# Patient Record
Sex: FEMALE | Race: WHITE | URBAN - METROPOLITAN AREA
[De-identification: names, ages, dates, MRNs, and addresses within clinical notes are randomized per-mention and may not be internally consistent; named-entity substitution may affect disease eponyms.]

---
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
## 2024-10-04 ENCOUNTER — APPOINTMENT (OUTPATIENT)
Dept: RADIOLOGY | Facility: MEDICAL CENTER | Age: 24
End: 2024-10-04
Attending: EMERGENCY MEDICINE
Payer: MEDICAID

## 2024-10-04 ENCOUNTER — HOSPITAL ENCOUNTER (EMERGENCY)
Facility: MEDICAL CENTER | Age: 24
End: 2024-10-04
Attending: EMERGENCY MEDICINE
Payer: MEDICAID

## 2024-10-04 VITALS
OXYGEN SATURATION: 97 % | SYSTOLIC BLOOD PRESSURE: 98 MMHG | RESPIRATION RATE: 20 BRPM | DIASTOLIC BLOOD PRESSURE: 70 MMHG | TEMPERATURE: 98.2 F | HEART RATE: 90 BPM

## 2024-10-04 DIAGNOSIS — N91.2 AMENORRHEA: ICD-10-CM

## 2024-10-04 DIAGNOSIS — M26.609 TMJ (TEMPOROMANDIBULAR JOINT SYNDROME): ICD-10-CM

## 2024-10-04 LAB
AMORPH CRY #/AREA URNS HPF: PRESENT /HPF
APPEARANCE UR: CLEAR
BACTERIA #/AREA URNS HPF: ABNORMAL /HPF
BILIRUB UR QL STRIP.AUTO: NEGATIVE
C TRACH DNA SPEC QL NAA+PROBE: NEGATIVE
COLOR UR: YELLOW
EPI CELLS #/AREA URNS HPF: ABNORMAL /HPF
GLUCOSE UR STRIP.AUTO-MCNC: NEGATIVE MG/DL
HCG UR QL: NEGATIVE
HIV 1+2 AB+HIV1 P24 AG SERPL QL IA: NORMAL
HYALINE CASTS #/AREA URNS LPF: ABNORMAL /LPF
KETONES UR STRIP.AUTO-MCNC: NEGATIVE MG/DL
LEUKOCYTE ESTERASE UR QL STRIP.AUTO: NEGATIVE
MICRO URNS: ABNORMAL
N GONORRHOEA DNA SPEC QL NAA+PROBE: NEGATIVE
NITRITE UR QL STRIP.AUTO: NEGATIVE
PH UR STRIP.AUTO: 7.5 [PH] (ref 5–8)
PROT UR QL STRIP: NEGATIVE MG/DL
RBC # URNS HPF: ABNORMAL /HPF
RBC UR QL AUTO: ABNORMAL
SP GR UR STRIP.AUTO: 1.02
SPECIMEN SOURCE: NORMAL
T PALLIDUM AB SER QL IA: NONREACTIVE
UROBILINOGEN UR STRIP.AUTO-MCNC: 0.2 MG/DL
WBC #/AREA URNS HPF: ABNORMAL /HPF

## 2024-10-04 PROCEDURE — 99284 EMERGENCY DEPT VISIT MOD MDM: CPT | Mod: EDC

## 2024-10-04 PROCEDURE — 86780 TREPONEMA PALLIDUM: CPT

## 2024-10-04 PROCEDURE — 87389 HIV-1 AG W/HIV-1&-2 AB AG IA: CPT

## 2024-10-04 PROCEDURE — 70355 PANORAMIC X-RAY OF JAWS: CPT

## 2024-10-04 PROCEDURE — A9270 NON-COVERED ITEM OR SERVICE: HCPCS | Mod: UD | Performed by: EMERGENCY MEDICINE

## 2024-10-04 PROCEDURE — 87491 CHLMYD TRACH DNA AMP PROBE: CPT

## 2024-10-04 PROCEDURE — 81025 URINE PREGNANCY TEST: CPT

## 2024-10-04 PROCEDURE — 87591 N.GONORRHOEAE DNA AMP PROB: CPT

## 2024-10-04 PROCEDURE — 81001 URINALYSIS AUTO W/SCOPE: CPT

## 2024-10-04 PROCEDURE — 700102 HCHG RX REV CODE 250 W/ 637 OVERRIDE(OP): Mod: UD | Performed by: EMERGENCY MEDICINE

## 2024-10-04 PROCEDURE — 36415 COLL VENOUS BLD VENIPUNCTURE: CPT | Mod: EDC

## 2024-10-04 RX ORDER — IBUPROFEN 600 MG/1
600 TABLET, FILM COATED ORAL ONCE
Status: COMPLETED | OUTPATIENT
Start: 2024-10-04 | End: 2024-10-04

## 2024-10-04 RX ORDER — ACETAMINOPHEN 500 MG
1000 TABLET ORAL ONCE
Status: COMPLETED | OUTPATIENT
Start: 2024-10-04 | End: 2024-10-04

## 2024-10-04 RX ADMIN — ACETAMINOPHEN 1000 MG: 500 TABLET ORAL at 12:04

## 2024-10-04 RX ADMIN — IBUPROFEN 600 MG: 600 TABLET, FILM COATED ORAL at 12:04

## 2024-10-04 ASSESSMENT — PAIN DESCRIPTION - PAIN TYPE: TYPE: ACUTE PAIN

## 2025-06-05 ENCOUNTER — HOSPITAL ENCOUNTER (EMERGENCY)
Facility: MEDICAL CENTER | Age: 25
End: 2025-06-05
Attending: EMERGENCY MEDICINE
Payer: MEDICAID

## 2025-06-05 VITALS
SYSTOLIC BLOOD PRESSURE: 104 MMHG | BODY MASS INDEX: 23 KG/M2 | HEART RATE: 60 BPM | HEIGHT: 62 IN | RESPIRATION RATE: 14 BRPM | OXYGEN SATURATION: 99 % | TEMPERATURE: 97.9 F | WEIGHT: 125 LBS | DIASTOLIC BLOOD PRESSURE: 67 MMHG

## 2025-06-05 DIAGNOSIS — F25.0 SCHIZOAFFECTIVE DISORDER, BIPOLAR TYPE (HCC): ICD-10-CM

## 2025-06-05 DIAGNOSIS — F30.9 MANIA (HCC): Primary | ICD-10-CM

## 2025-06-05 LAB
ALBUMIN SERPL BCP-MCNC: 4.5 G/DL (ref 3.2–4.9)
ALBUMIN/GLOB SERPL: 1.6 G/DL
ALP SERPL-CCNC: 107 U/L (ref 30–99)
ALT SERPL-CCNC: 37 U/L (ref 2–50)
AMPHET UR QL SCN: NEGATIVE
ANION GAP SERPL CALC-SCNC: 12 MMOL/L (ref 7–16)
AST SERPL-CCNC: 37 U/L (ref 12–45)
BARBITURATES UR QL SCN: NEGATIVE
BASOPHILS # BLD AUTO: 0.5 % (ref 0–1.8)
BASOPHILS # BLD: 0.05 K/UL (ref 0–0.12)
BENZODIAZ UR QL SCN: NEGATIVE
BILIRUB SERPL-MCNC: 0.2 MG/DL (ref 0.1–1.5)
BUN SERPL-MCNC: 13 MG/DL (ref 8–22)
BZE UR QL SCN: NEGATIVE
CALCIUM ALBUM COR SERPL-MCNC: 8.6 MG/DL (ref 8.5–10.5)
CALCIUM SERPL-MCNC: 9 MG/DL (ref 8.5–10.5)
CANNABINOIDS UR QL SCN: POSITIVE
CHLORIDE SERPL-SCNC: 106 MMOL/L (ref 96–112)
CO2 SERPL-SCNC: 23 MMOL/L (ref 20–33)
CREAT SERPL-MCNC: 0.73 MG/DL (ref 0.5–1.4)
EOSINOPHIL # BLD AUTO: 0.03 K/UL (ref 0–0.51)
EOSINOPHIL NFR BLD: 0.3 % (ref 0–6.9)
ERYTHROCYTE [DISTWIDTH] IN BLOOD BY AUTOMATED COUNT: 44.3 FL (ref 35.9–50)
FENTANYL UR QL: NEGATIVE
GFR SERPLBLD CREATININE-BSD FMLA CKD-EPI: 118 ML/MIN/1.73 M 2
GLOBULIN SER CALC-MCNC: 2.9 G/DL (ref 1.9–3.5)
GLUCOSE SERPL-MCNC: 88 MG/DL (ref 65–99)
HCG SERPL QL: NEGATIVE
HCT VFR BLD AUTO: 38.6 % (ref 37–47)
HGB BLD-MCNC: 12.7 G/DL (ref 12–16)
HIV 1+2 AB+HIV1 P24 AG SERPL QL IA: NORMAL
IMM GRANULOCYTES # BLD AUTO: 0.05 K/UL (ref 0–0.11)
IMM GRANULOCYTES NFR BLD AUTO: 0.5 % (ref 0–0.9)
LYMPHOCYTES # BLD AUTO: 2.72 K/UL (ref 1–4.8)
LYMPHOCYTES NFR BLD: 29.4 % (ref 22–41)
MCH RBC QN AUTO: 30.8 PG (ref 27–33)
MCHC RBC AUTO-ENTMCNC: 32.9 G/DL (ref 32.2–35.5)
MCV RBC AUTO: 93.5 FL (ref 81.4–97.8)
METHADONE UR QL SCN: NEGATIVE
MONOCYTES # BLD AUTO: 0.5 K/UL (ref 0–0.85)
MONOCYTES NFR BLD AUTO: 5.4 % (ref 0–13.4)
NEUTROPHILS # BLD AUTO: 5.91 K/UL (ref 1.82–7.42)
NEUTROPHILS NFR BLD: 63.9 % (ref 44–72)
NRBC # BLD AUTO: 0 K/UL
NRBC BLD-RTO: 0 /100 WBC (ref 0–0.2)
OPIATES UR QL SCN: NEGATIVE
OXYCODONE UR QL SCN: NEGATIVE
PCP UR QL SCN: NEGATIVE
PLATELET # BLD AUTO: 287 K/UL (ref 164–446)
PMV BLD AUTO: 10.1 FL (ref 9–12.9)
POTASSIUM SERPL-SCNC: 3.8 MMOL/L (ref 3.6–5.5)
PROPOXYPH UR QL SCN: NEGATIVE
PROT SERPL-MCNC: 7.4 G/DL (ref 6–8.2)
RBC # BLD AUTO: 4.13 M/UL (ref 4.2–5.4)
SODIUM SERPL-SCNC: 141 MMOL/L (ref 135–145)
T PALLIDUM AB SER QL IA: NORMAL
TSH SERPL DL<=0.005 MIU/L-ACNC: 2.14 UIU/ML (ref 0.38–5.33)
WBC # BLD AUTO: 9.3 K/UL (ref 4.8–10.8)

## 2025-06-05 PROCEDURE — 85025 COMPLETE CBC W/AUTO DIFF WBC: CPT

## 2025-06-05 PROCEDURE — 86780 TREPONEMA PALLIDUM: CPT

## 2025-06-05 PROCEDURE — 36415 COLL VENOUS BLD VENIPUNCTURE: CPT

## 2025-06-05 PROCEDURE — A9270 NON-COVERED ITEM OR SERVICE: HCPCS | Mod: UD | Performed by: EMERGENCY MEDICINE

## 2025-06-05 PROCEDURE — 99285 EMERGENCY DEPT VISIT HI MDM: CPT

## 2025-06-05 PROCEDURE — 84703 CHORIONIC GONADOTROPIN ASSAY: CPT

## 2025-06-05 PROCEDURE — 700111 HCHG RX REV CODE 636 W/ 250 OVERRIDE (IP): Mod: JZ,UD | Performed by: EMERGENCY MEDICINE

## 2025-06-05 PROCEDURE — 80053 COMPREHEN METABOLIC PANEL: CPT

## 2025-06-05 PROCEDURE — 87591 N.GONORRHOEAE DNA AMP PROB: CPT

## 2025-06-05 PROCEDURE — 80307 DRUG TEST PRSMV CHEM ANLYZR: CPT

## 2025-06-05 PROCEDURE — 96372 THER/PROPH/DIAG INJ SC/IM: CPT

## 2025-06-05 PROCEDURE — 700102 HCHG RX REV CODE 250 W/ 637 OVERRIDE(OP): Mod: UD | Performed by: EMERGENCY MEDICINE

## 2025-06-05 PROCEDURE — 87491 CHLMYD TRACH DNA AMP PROBE: CPT

## 2025-06-05 PROCEDURE — 90791 PSYCH DIAGNOSTIC EVALUATION: CPT

## 2025-06-05 PROCEDURE — 84443 ASSAY THYROID STIM HORMONE: CPT

## 2025-06-05 PROCEDURE — 87389 HIV-1 AG W/HIV-1&-2 AB AG IA: CPT

## 2025-06-05 RX ORDER — HALOPERIDOL 5 MG/ML
5 INJECTION INTRAMUSCULAR ONCE
Status: COMPLETED | OUTPATIENT
Start: 2025-06-05 | End: 2025-06-05

## 2025-06-05 RX ORDER — HYDROXYZINE HYDROCHLORIDE 25 MG/1
25 TABLET, FILM COATED ORAL ONCE
Status: COMPLETED | OUTPATIENT
Start: 2025-06-05 | End: 2025-06-05

## 2025-06-05 RX ORDER — HALOPERIDOL 5 MG/1
5 TABLET ORAL ONCE
Status: COMPLETED | OUTPATIENT
Start: 2025-06-05 | End: 2025-06-05

## 2025-06-05 RX ORDER — DIAZEPAM 5 MG/1
5 TABLET ORAL ONCE
Status: COMPLETED | OUTPATIENT
Start: 2025-06-05 | End: 2025-06-05

## 2025-06-05 RX ORDER — MIDAZOLAM HYDROCHLORIDE 5 MG/ML
4 INJECTION INTRAMUSCULAR; INTRAVENOUS ONCE
Status: COMPLETED | OUTPATIENT
Start: 2025-06-05 | End: 2025-06-05

## 2025-06-05 RX ADMIN — MIDAZOLAM HYDROCHLORIDE 4 MG: 5 INJECTION, SOLUTION INTRAMUSCULAR; INTRAVENOUS at 12:45

## 2025-06-05 RX ADMIN — HALOPERIDOL LACTATE 5 MG: 5 INJECTION, SOLUTION INTRAMUSCULAR at 12:46

## 2025-06-05 RX ADMIN — HALOPERIDOL 5 MG: 5 TABLET ORAL at 11:18

## 2025-06-05 RX ADMIN — DIAZEPAM 5 MG: 5 TABLET ORAL at 11:18

## 2025-06-05 RX ADMIN — HYDROXYZINE HYDROCHLORIDE 25 MG: 25 TABLET ORAL at 11:18

## 2025-06-05 NOTE — ED NOTES
Pt is pacing around room continues to come out into the villafana speaking in a loud voice, she is easily directed back to room. Sitter outside room.

## 2025-06-05 NOTE — ED NOTES
Patient resting on stretcher in no acute distress. Equal chest rise noted. VS stable on monitor. Bed locked and in low position. 1:1 sitter in clear view of pt

## 2025-06-05 NOTE — ED PROVIDER NOTES
ED Provider Note    CHIEF COMPLAINT  Chief Complaint   Patient presents with    Psych Eval     Abnormal behavior was picked up at [facility] pt apparently went to the highschool and told them she needed help [employer] on scene and then [facility] brought to ER          HPI/ROS  LIMITATION TO HISTORY   Select: Behavior Jazmin Oneall is a 24 y.o. female who presents with abnormal behavior.  Patient was [facility] at [facility] with pressured speech, tangential and asking for help.  Patient states that she went to [facility] because she went to the [facility] earlier to call her , for unclear reasons, she then states upon leaving the [facility] she felt like she was full of magnets and wanted someone to help her with this, and so she went to [facility] and asked for help.  Patient is very tangential, and very difficult to follow secondary to this.  Patient denies any recent trauma, she is unsure if she is pregnant, she reports her last period was on the last full noon.  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  She does report occasionally she uses Adderall but has not used this in the last few months.  Reports she occasionally takes Atarax to help with her mood.  She denies any history of psychiatric illness.  Patient reports she is sexually active with multiple partners.    PAST MEDICAL HISTORY   has a past medical history of Anxiety, Asthma, Cold (2016), Cold sore (2016), and Depression.    SURGICAL HISTORY   has a past surgical history that includes orif, hand (Right, 2016) and hardware removal ortho (Right, 10/4/2016).    FAMILY HISTORY  Family History   Family history unknown: Yes       SOCIAL HISTORY  Social History     Tobacco Use    Smoking status: Former     Current packs/day: 0.00     Average packs/day: 0.5 packs/day for 1 year (0.5 ttl pk-yrs)     Types: Cigarettes     Start date: 2015     Quit date: 2016     Years since quittin.7    Smokeless  "tobacco: Not on file   Vaping Use    Vaping status: Former   Substance and Sexual Activity    Alcohol use: Yes     Comment: none since 9/21/2016    Drug use: Yes     Types: Inhaled, Oral     Comment: marijuana; none since 9/21/2016    Sexual activity: Not on file       CURRENT MEDICATIONS  Home Medications       Reviewed by Isabell Medeiros R.N. (Registered Nurse) on 06/05/25 at 1011  Med List Status: Not Addressed     Medication Last Dose Status   acyclovir (ZOVIRAX) 400 MG tablet  Active                    ALLERGIES  Allergies[1]    PHYSICAL EXAM  VITAL SIGNS: /74   Pulse (!) 128   Temp 36.6 °C (97.9 °F)   Resp 18   Ht 1.575 m (5' 2\")   Wt 56.7 kg (125 lb)   SpO2 98%   BMI 22.86 kg/m²    Physical Exam  Constitutional:       Appearance: She is well-developed.   HENT:      Head: Normocephalic and atraumatic.   Eyes:      Pupils: Pupils are equal, round, and reactive to light.   Cardiovascular:      Rate and Rhythm: Regular rhythm. Tachycardia present.   Pulmonary:      Effort: Pulmonary effort is normal. No accessory muscle usage or respiratory distress.      Breath sounds: Normal breath sounds.   Abdominal:      General: Bowel sounds are normal.      Palpations: Abdomen is soft. There is no mass.      Tenderness: There is no abdominal tenderness.   Musculoskeletal:         General: Normal range of motion.   Skin:     General: Skin is warm.      Capillary Refill: Capillary refill takes less than 2 seconds.   Neurological:      General: No focal deficit present.      Mental Status: She is alert.   Psychiatric:         Mood and Affect: Mood is not anxious.      Comments: Tangential, paranoid, significant pressured speech, very difficult to follow.  Denies SI or HI           EKG/LABS  Results for orders placed or performed during the hospital encounter of 06/05/25   URINE DRUG SCREEN    Collection Time: 06/05/25 10:35 AM   Result Value Ref Range    Amphetamines Urine Negative Negative    Barbiturates " Negative Negative    Benzodiazepines Negative Negative    Cocaine Metabolite Negative Negative    Fentanyl, Urine Negative Negative    Methadone Negative Negative    Opiates Negative Negative    Oxycodone Negative Negative    Phencyclidine -Pcp Negative Negative    Propoxyphene Negative Negative    Cannabinoid Metab Positive (A) Negative   Chlamydia/GC, PCR (Urine)    Collection Time: 06/05/25 10:35 AM    Specimen: Urine   Result Value Ref Range    Source Urine    CBC WITH DIFFERENTIAL    Collection Time: 06/05/25 11:04 AM   Result Value Ref Range    WBC 9.3 4.8 - 10.8 K/uL    RBC 4.13 (L) 4.20 - 5.40 M/uL    Hemoglobin 12.7 12.0 - 16.0 g/dL    Hematocrit 38.6 37.0 - 47.0 %    MCV 93.5 81.4 - 97.8 fL    MCH 30.8 27.0 - 33.0 pg    MCHC 32.9 32.2 - 35.5 g/dL    RDW 44.3 35.9 - 50.0 fL    Platelet Count 287 164 - 446 K/uL    MPV 10.1 9.0 - 12.9 fL    Neutrophils-Polys 63.90 44.00 - 72.00 %    Lymphocytes 29.40 22.00 - 41.00 %    Monocytes 5.40 0.00 - 13.40 %    Eosinophils 0.30 0.00 - 6.90 %    Basophils 0.50 0.00 - 1.80 %    Immature Granulocytes 0.50 0.00 - 0.90 %    Nucleated RBC 0.00 0.00 - 0.20 /100 WBC    Neutrophils (Absolute) 5.91 1.82 - 7.42 K/uL    Lymphs (Absolute) 2.72 1.00 - 4.80 K/uL    Monos (Absolute) 0.50 0.00 - 0.85 K/uL    Eos (Absolute) 0.03 0.00 - 0.51 K/uL    Baso (Absolute) 0.05 0.00 - 0.12 K/uL    Immature Granulocytes (abs) 0.05 0.00 - 0.11 K/uL    NRBC (Absolute) 0.00 K/uL   CMP    Collection Time: 06/05/25 11:04 AM   Result Value Ref Range    Sodium 141 135 - 145 mmol/L    Potassium 3.8 3.6 - 5.5 mmol/L    Chloride 106 96 - 112 mmol/L    Co2 23 20 - 33 mmol/L    Anion Gap 12.0 7.0 - 16.0    Glucose 88 65 - 99 mg/dL    Bun 13 8 - 22 mg/dL    Creatinine 0.73 0.50 - 1.40 mg/dL    Calcium 9.0 8.5 - 10.5 mg/dL    Correct Calcium 8.6 8.5 - 10.5 mg/dL    AST(SGOT) 37 12 - 45 U/L    ALT(SGPT) 37 2 - 50 U/L    Alkaline Phosphatase 107 (H) 30 - 99 U/L    Total Bilirubin 0.2 0.1 - 1.5 mg/dL    Albumin  4.5 3.2 - 4.9 g/dL    Total Protein 7.4 6.0 - 8.2 g/dL    Globulin 2.9 1.9 - 3.5 g/dL    A-G Ratio 1.6 g/dL   HIV AG/AB Combo Assay Screening    Collection Time: 06/05/25 11:04 AM   Result Value Ref Range    HIV Ag/Ab Combo Assay Non-Reactive Non Reactive   T.Pallidum AB DANIELLE (Syphilis Screening)    Collection Time: 06/05/25 11:04 AM   Result Value Ref Range    Syphilis, Treponemal Qual Non-Reactive Non-Reactive   HCG QUAL SERUM    Collection Time: 06/05/25 11:04 AM   Result Value Ref Range    Beta-Hcg Qualitative Serum Negative Negative   TSH WITH REFLEX TO FT4    Collection Time: 06/05/25 11:04 AM   Result Value Ref Range    TSH 2.140 0.380 - 5.330 uIU/mL   ESTIMATED GFR    Collection Time: 06/05/25 11:04 AM   Result Value Ref Range    GFR (CKD-EPI) 118 >60 mL/min/1.73 m 2       I have independently interpreted this EKG        CRITICAL CARE  The very real possibilty of a deterioration of this patient's condition required the highest level of my preparedness for sudden, emergent intervention including management of her acute jess with intramuscular Haldol and Versed.  I provided critical care services, which included medication orders, frequent reevaluations of the patient's condition and response to treatment, ordering and reviewing test results, and discussing the case with various consultants.  The critical care time associated with the care of the patient was 31 minutes. Review chart for interventions. This time is exclusive of any other billable procedures.       COURSE & MEDICAL DECISION MAKING    ASSESSMENT, COURSE AND PLAN  Care Narrative: Patient here very agitated, with pressured speech and very tangential.  Sympathomimetic drug use is certainly a possibility here versus decompensated ejss secondary to psychiatric illness.  Given residual tachycardia and jess and lack of prior hospitalizations for similar will check basic labs as well as TSH.  Patient without any focal neurologic deficits to suggest  neurologic lesion.  Labs are unremarkable, symptoms likely secondary to psychiatric illness rather than organic etiology.  Despite p.o. Haldol and Valium patient remains very agitated, and now being verbally abusive.  Unable to de-escalate verbally and therefore patient was given IM Haldol and Versed.  Patient placed on legal hold as I do not believe she is able to care for herself.    ED OBS: Yes; I am placing the patient in to an observation status due to a diagnostic uncertainty as well as therapeutic intensity. Patient placed in observation status at 11:54 AM, 6/5/2025.     Observation plan is as follows: Continue to manage patient's jess and place in psychiatric facility when available                  DISPOSITION AND DISCUSSIONS      Barriers to care at this time, including but not limited to: Patient does not have established PCP.       FINAL DIAGNOSIS  Acute jess         [1] No Known Allergies

## 2025-06-05 NOTE — ED NOTES
Pt increased agitated.Pt taking off clothing and screaming at staff. Pt medicated per MAR for agitation. Pt continues to refuse to wear blue paper scrubs.

## 2025-06-05 NOTE — ED NOTES
Patient resting on stretcher in no acute distress. Equal chest rise noted. VS stable on monitor. Bed locked and in low position. 1:1 sitter in clear view

## 2025-06-06 LAB
C TRACH DNA SPEC QL NAA+PROBE: NEGATIVE
N GONORRHOEA DNA SPEC QL NAA+PROBE: NEGATIVE
SPECIMEN SOURCE: NORMAL

## 2025-06-06 NOTE — ED NOTES
Patient resting on stretcher in no acute distress. Equal chest rise noted. Bed locked and in low position. 1:1 sitter in clear view

## 2025-06-06 NOTE — ED NOTES
Bedside report received from Shiela CUEVAS RN. Upon shift change pt is resting in bed with even and unlabored breaths, in no apparent distress. L2K precautions are in place, including 1:1 sitter. Will continue to monitor.

## 2025-06-06 NOTE — DISCHARGE PLANNING
[facility] ED Behavioral Health Fax Referral      Referral: Legal Hold    Intervention: Patient referral to community inpatient  facillity    Legal Hold Initiated: Date: 6/5/25 Time: 1035    Patient’s Insurance Listed on Face Sheet: Medicaid FFS    Referrals sent to:  [facility], [facility], [facility] , [facility], [facility]  Referrals faxed by [facility] ([facility]), fax [phone]    This referral contains the following information:  Face sheet __x__  Page 1 and Page 2 of Legal Hold __x__  Alert Team Assessment/Psych Assessment ____  Head to toe physical exam _x___  Urine Drug Screen __x__  Alcohol ____  Vital signs __x__  Pregnancy test when applicable _x__  Medications list __x__  Covid screening ____    Plan: Patient will transfer to mental health facility once acceptance is obtained    For all referral information, please contact the  referral office daily between the hours of 7:00 a.m. to 6:00 p.m. at:   Phone [phone]   Fax [phone]    ED  Alert Team   Phone [phone] Fax [phone]    [facility] Emergency Department Contact numbers:  Green Pod [phone]   Blue Pod [phone]   Red Pod [phone]   Pediatrics [phone]

## 2025-06-06 NOTE — ED NOTES
Report given to [facility], who will be transporting pt to [facility]. Pt willingly put paper scrubs on and her belongings were given to EMS for transport. Pt ambulatory without assistance at this time and shows no signs of distress. Pt's paperwork and belongings sent with EMS.

## 2025-06-06 NOTE — ED NOTES
Pt still sleeping with even and unlabored breaths in view of 1:1 sitter. No distress noted. Will continue to monitor pt while awaiting EMS arrival to transport pt to psych facility.
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
## 2025-06-06 NOTE — ED NOTES
Pt sleeping in bed in view of RN station and 1:1 sitter. No acute distress is noted and pt's breaths are even and unlabored at this time. Will continue to monitor.

## 2025-06-30 ENCOUNTER — HOSPITAL ENCOUNTER (EMERGENCY)
Facility: MEDICAL CENTER | Age: 25
End: 2025-06-30
Attending: STUDENT IN AN ORGANIZED HEALTH CARE EDUCATION/TRAINING PROGRAM
Payer: MEDICAID

## 2025-06-30 VITALS
HEART RATE: 94 BPM | WEIGHT: 125 LBS | RESPIRATION RATE: 16 BRPM | TEMPERATURE: 97.8 F | DIASTOLIC BLOOD PRESSURE: 74 MMHG | SYSTOLIC BLOOD PRESSURE: 114 MMHG | BODY MASS INDEX: 23 KG/M2 | OXYGEN SATURATION: 96 % | HEIGHT: 62 IN

## 2025-06-30 DIAGNOSIS — F20.9 SCHIZOPHRENIA, UNSPECIFIED TYPE (HCC): Primary | ICD-10-CM

## 2025-06-30 LAB
ALBUMIN SERPL BCP-MCNC: 4.7 G/DL (ref 3.2–4.9)
ALBUMIN/GLOB SERPL: 1.3 G/DL
ALP SERPL-CCNC: 96 U/L (ref 30–99)
ALT SERPL-CCNC: 44 U/L (ref 2–50)
ANION GAP SERPL CALC-SCNC: 15 MMOL/L (ref 7–16)
AST SERPL-CCNC: 94 U/L (ref 12–45)
BASOPHILS # BLD AUTO: 0.4 % (ref 0–1.8)
BASOPHILS # BLD: 0.04 K/UL (ref 0–0.12)
BILIRUB SERPL-MCNC: 0.7 MG/DL (ref 0.1–1.5)
BUN SERPL-MCNC: 28 MG/DL (ref 8–22)
CALCIUM ALBUM COR SERPL-MCNC: 8.6 MG/DL (ref 8.5–10.5)
CALCIUM SERPL-MCNC: 9.2 MG/DL (ref 8.5–10.5)
CHLORIDE SERPL-SCNC: 97 MMOL/L (ref 96–112)
CO2 SERPL-SCNC: 20 MMOL/L (ref 20–33)
CREAT SERPL-MCNC: 0.95 MG/DL (ref 0.5–1.4)
EOSINOPHIL # BLD AUTO: 0.06 K/UL (ref 0–0.51)
EOSINOPHIL NFR BLD: 0.5 % (ref 0–6.9)
ERYTHROCYTE [DISTWIDTH] IN BLOOD BY AUTOMATED COUNT: 44.3 FL (ref 35.9–50)
GFR SERPLBLD CREATININE-BSD FMLA CKD-EPI: 86 ML/MIN/1.73 M 2
GLOBULIN SER CALC-MCNC: 3.5 G/DL (ref 1.9–3.5)
GLUCOSE SERPL-MCNC: 95 MG/DL (ref 65–99)
HCT VFR BLD AUTO: 40.3 % (ref 37–47)
HGB BLD-MCNC: 13.8 G/DL (ref 12–16)
IMM GRANULOCYTES # BLD AUTO: 0.03 K/UL (ref 0–0.11)
IMM GRANULOCYTES NFR BLD AUTO: 0.3 % (ref 0–0.9)
LIPASE SERPL-CCNC: 24 U/L (ref 11–82)
LYMPHOCYTES # BLD AUTO: 2.88 K/UL (ref 1–4.8)
LYMPHOCYTES NFR BLD: 25.8 % (ref 22–41)
MCH RBC QN AUTO: 30.5 PG (ref 27–33)
MCHC RBC AUTO-ENTMCNC: 34.2 G/DL (ref 32.2–35.5)
MCV RBC AUTO: 89.2 FL (ref 81.4–97.8)
MONOCYTES # BLD AUTO: 1.03 K/UL (ref 0–0.85)
MONOCYTES NFR BLD AUTO: 9.2 % (ref 0–13.4)
NEUTROPHILS # BLD AUTO: 7.11 K/UL (ref 1.82–7.42)
NEUTROPHILS NFR BLD: 63.8 % (ref 44–72)
NRBC # BLD AUTO: 0 K/UL
NRBC BLD-RTO: 0 /100 WBC (ref 0–0.2)
PLATELET # BLD AUTO: 355 K/UL (ref 164–446)
PMV BLD AUTO: 9.4 FL (ref 9–12.9)
POTASSIUM SERPL-SCNC: 4.1 MMOL/L (ref 3.6–5.5)
PROT SERPL-MCNC: 8.2 G/DL (ref 6–8.2)
RBC # BLD AUTO: 4.52 M/UL (ref 4.2–5.4)
SODIUM SERPL-SCNC: 132 MMOL/L (ref 135–145)
WBC # BLD AUTO: 11.2 K/UL (ref 4.8–10.8)

## 2025-06-30 PROCEDURE — 36415 COLL VENOUS BLD VENIPUNCTURE: CPT

## 2025-06-30 PROCEDURE — 85025 COMPLETE CBC W/AUTO DIFF WBC: CPT

## 2025-06-30 PROCEDURE — 90791 PSYCH DIAGNOSTIC EVALUATION: CPT

## 2025-06-30 PROCEDURE — 80053 COMPREHEN METABOLIC PANEL: CPT

## 2025-06-30 PROCEDURE — 302970 POC BREATHALIZER: Performed by: STUDENT IN AN ORGANIZED HEALTH CARE EDUCATION/TRAINING PROGRAM

## 2025-06-30 PROCEDURE — 99284 EMERGENCY DEPT VISIT MOD MDM: CPT

## 2025-06-30 PROCEDURE — 83690 ASSAY OF LIPASE: CPT

## 2025-06-30 ASSESSMENT — FIBROSIS 4 INDEX: FIB4 SCORE: 0.49
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
## 2025-06-30 NOTE — ED TRIAGE NOTES
Chief Complaint   Patient presents with    Psych Eval     BIB EMS from streets for bilateral foot pain secondary to walking in heat. Report of hallucinations. Hx of schizophrenia. FSBS 120. Pt has reportedly been off Wellbutrin and trazodone for a while. Denies suicidal or homicidal thoughts at this time.

## 2025-06-30 NOTE — ED PROVIDER NOTES
ED Provider Note    CHIEF COMPLAINT  Chief Complaint   Patient presents with    Psych Eval     BIB EMS from streets for bilateral foot pain secondary to walking in heat. Report of hallucinations. Hx of schizophrenia. FSBS 120. Pt has reportedly been off Wellbutrin and trazodone for a while. Denies suicidal or homicidal thoughts at this time.        EXTERNAL RECORDS REVIEWED  Patient was seen here on  for similar presentation.  She was tangential, paranoid with pressured speech.  She did have labs that were unremarkable.  She was placed on a legal hold for grave disability.    HPI/ROS  LIMITATION TO HISTORY   Behavior  OUTSIDE HISTORIAN(S):  EMS    Cristiane Mcgowan is a 25 y.o. female who presents with abnormal behavior.  Patient was found wandering the streets without shoes in the streets.  She does have a history of schizophrenia and is reporting hallucinations.  She says that she has not been on her Wellbutrin or trazodone although they have been prescribed because she 'does not have an ID'.  She denies any thoughts of hurting herself or others.  She has no medical complaints other than pain in her feet.  EMS did obtain a blood glucose which was normal.    PAST MEDICAL HISTORY   has a past medical history of Anxiety, Asthma, Cold (2016), Cold sore (2016), and Depression.    SURGICAL HISTORY   has a past surgical history that includes orif, hand (Right, 2016) and hardware removal ortho (Right, 10/4/2016).    FAMILY HISTORY  Family History   Family history unknown: Yes       SOCIAL HISTORY  Social History     Tobacco Use    Smoking status: Former     Current packs/day: 0.00     Average packs/day: 0.5 packs/day for 1 year (0.5 ttl pk-yrs)     Types: Cigarettes     Start date: 2015     Quit date: 2016     Years since quittin.7    Smokeless tobacco: Not on file   Vaping Use    Vaping status: Former   Substance and Sexual Activity    Alcohol use: Yes     Comment: none since 2016     "Drug use: Yes     Types: Inhaled, Oral     Comment: marijuana; none since 9/21/2016    Sexual activity: Not on file       CURRENT MEDICATIONS  Home Medications       Reviewed by Michelle Santiago R.N. (Registered Nurse) on 06/30/25 at 1551  Med List Status: Partial     Medication Last Dose Status   acyclovir (ZOVIRAX) 400 MG tablet  Active                    ALLERGIES  Allergies[1]    PHYSICAL EXAM  VITAL SIGNS: /74   Pulse 94   Temp 36.6 °C (97.8 °F) (Temporal)   Resp 16   Ht 1.575 m (5' 2\")   Wt 56.7 kg (125 lb)   LMP 06/30/2025   SpO2 96%   BMI 22.86 kg/m²    Constitutional: Awake and alert Non toxic  HENT: Normal inspection    Eyes: Normal inspection  Neck: Grossly normal range of motion.  Cardiovascular: Normal heart rate, Normal rhythm.   Thorax & Lungs: No respiratory distress  Abdomen: Soft, non-distended, nontender   Skin: No obvious rash.  Extremities: Warm, well perfused.  Her bilateral feet are nontender to palpation.  No burns.  No blisters.  No bony tenderness to palpation.  Motor and sensory exams grossly intact  Neurologic: Grossly normal   Psychiatric: She is disorganized.  She denies SI/HI      EKG/LABS  Results for orders placed or performed during the hospital encounter of 06/30/25   CBC WITH DIFFERENTIAL    Collection Time: 06/30/25  5:39 PM   Result Value Ref Range    WBC 11.2 (H) 4.8 - 10.8 K/uL    RBC 4.52 4.20 - 5.40 M/uL    Hemoglobin 13.8 12.0 - 16.0 g/dL    Hematocrit 40.3 37.0 - 47.0 %    MCV 89.2 81.4 - 97.8 fL    MCH 30.5 27.0 - 33.0 pg    MCHC 34.2 32.2 - 35.5 g/dL    RDW 44.3 35.9 - 50.0 fL    Platelet Count 355 164 - 446 K/uL    MPV 9.4 9.0 - 12.9 fL    Neutrophils-Polys 63.80 44.00 - 72.00 %    Lymphocytes 25.80 22.00 - 41.00 %    Monocytes 9.20 0.00 - 13.40 %    Eosinophils 0.50 0.00 - 6.90 %    Basophils 0.40 0.00 - 1.80 %    Immature Granulocytes 0.30 0.00 - 0.90 %    Nucleated RBC 0.00 0.00 - 0.20 /100 WBC    Neutrophils (Absolute) 7.11 1.82 - 7.42 K/uL    Lymphs " (Absolute) 2.88 1.00 - 4.80 K/uL    Monos (Absolute) 1.03 (H) 0.00 - 0.85 K/uL    Eos (Absolute) 0.06 0.00 - 0.51 K/uL    Baso (Absolute) 0.04 0.00 - 0.12 K/uL    Immature Granulocytes (abs) 0.03 0.00 - 0.11 K/uL    NRBC (Absolute) 0.00 K/uL   COMP METABOLIC PANEL    Collection Time: 06/30/25  5:39 PM   Result Value Ref Range    Sodium 132 (L) 135 - 145 mmol/L    Potassium 4.1 3.6 - 5.5 mmol/L    Chloride 97 96 - 112 mmol/L    Co2 20 20 - 33 mmol/L    Anion Gap 15.0 7.0 - 16.0    Glucose 95 65 - 99 mg/dL    Bun 28 (H) 8 - 22 mg/dL    Creatinine 0.95 0.50 - 1.40 mg/dL    Calcium 9.2 8.5 - 10.5 mg/dL    Correct Calcium 8.6 8.5 - 10.5 mg/dL    AST(SGOT) 94 (H) 12 - 45 U/L    ALT(SGPT) 44 2 - 50 U/L    Alkaline Phosphatase 96 30 - 99 U/L    Total Bilirubin 0.7 0.1 - 1.5 mg/dL    Albumin 4.7 3.2 - 4.9 g/dL    Total Protein 8.2 6.0 - 8.2 g/dL    Globulin 3.5 1.9 - 3.5 g/dL    A-G Ratio 1.3 g/dL   LIPASE    Collection Time: 06/30/25  5:39 PM   Result Value Ref Range    Lipase 24 11 - 82 U/L   ESTIMATED GFR    Collection Time: 06/30/25  5:39 PM   Result Value Ref Range    GFR (CKD-EPI) 86 >60 mL/min/1.73 m 2           COURSE & MEDICAL DECISION MAKING    ASSESSMENT, COURSE AND PLAN  Care Narrative: 25-year-old with history of schizophrenia presents disorganized, tangential does appear to be decompensated from a mental health standpoint.  She has normal vital signs.  Breathalyzer negative.  She denies any history of drug use.  Her blood glucose is normal.  I did review labs that were obtained here in early June which were largely unremarkable.  These were repeated today and again quite reassuring she does have a mild leukocytosis no other infectious symptoms and she is mildly hypokalemic.  She does have a history of psychiatric illness and this does seem to be consistent with decompensated mental illness.  She is clearly unable to care for herself and has been placed on a legal hold.  She will be transferred to an inpatient  facility for stabilization.    ED OBS: Yes; I am placing the patient in to an observation status due to a diagnostic uncertainty as well as therapeutic intensity. Patient placed in observation status at 4:05 PM, 6/30/2025.     Observation plan is as follows: transfer to mental health facility     Upon Reevaluation, the patient's condition has: Improved; and will be discharged.    Patient discharged from ED Observation status at 19.10PM 6/30/2025        DISPOSITION AND DISCUSSIONS  I have discussed management of the patient with the following physicians and JOANNE's:  none    Discussion of management with other [facility] or appropriate source(s): Behavioral Health agree with certification of legal hold and transferred to inpatient facility     Escalation of care considered, and ultimately not performed:IV fluids she is able to tolerate PO    Barriers to care at this time, including but not limited to: none    Decision tools and prescription drugs considered including, but not limited to: none.    FINAL DIAGNOSIS  1. Schizophrenia, unspecified type (HCC)         Electronically signed by: Na Mcdaniels M.D., 6/30/2025 4:05 PM           [1] No Known Allergies

## 2025-07-01 NOTE — ED NOTES
Pt ambulated out of ED with [facility] transport team, pt transferred to [facility]